# Patient Record
Sex: FEMALE | Race: WHITE | URBAN - METROPOLITAN AREA
[De-identification: names, ages, dates, MRNs, and addresses within clinical notes are randomized per-mention and may not be internally consistent; named-entity substitution may affect disease eponyms.]

---

## 2019-01-21 ENCOUNTER — THERAPY VISIT (OUTPATIENT)
Dept: PHYSICAL THERAPY | Facility: CLINIC | Age: 39
End: 2019-01-21
Payer: COMMERCIAL

## 2019-01-21 DIAGNOSIS — M54.2 NECK PAIN: ICD-10-CM

## 2019-01-21 DIAGNOSIS — G44.209 TENSION HEADACHE: ICD-10-CM

## 2019-01-21 PROCEDURE — 97112 NEUROMUSCULAR REEDUCATION: CPT | Mod: GP | Performed by: PHYSICAL THERAPIST

## 2019-01-21 PROCEDURE — 97161 PT EVAL LOW COMPLEX 20 MIN: CPT | Mod: GP | Performed by: PHYSICAL THERAPIST

## 2019-01-21 PROCEDURE — 97110 THERAPEUTIC EXERCISES: CPT | Mod: GP | Performed by: PHYSICAL THERAPIST

## 2019-01-21 NOTE — LETTER
[facility] ATHLETIC [facility] PHYSICAL THERAPY  [address]  [place] [address]  [phone]    2019    Re: Ashwini Garcia   :   1980  MRN:  [id number]   REFERRING PHYSICIAN:   Samuel Fields    [facility]    Date of Initial Evaluation:  2019  Visits:  Rxs Used: 1  Reason for Referral:     Neck pain  Tension headache    EVALUATION SUMMARY    Subjective:  Ashwini Garcia is a 38 year old female with a cervical spine condition. This is a new condition  19 onset increased tension and headache for unknown reasons other than stress.  Then provoked a migraine and then a headache. Bad asthma so coughs a lot and hunches forward, has 7 kids,  meth addict and just changed jobs so lots of stress.    Hypertension crisis.    Patient reports pain:  Cervical left side and cervical right side.  Radiates to: headache from back of neck/base of skull to top of head.  Pain is described as other (tightness, soreness with pressure) and is constant (headaches intermittent multiple times per day) and reported as 7/10 (7/10 at worst HA, neck 4/10 constant).  Associated symptoms:  Headache (SO region headaches, needs to re-focus/blink eyes sometimes). Pain is the same all the time.  Symptoms are exacerbated by looking up or down (looking down at phone, looking at artificial lights, had to call in sick to work due to HA) and relieved by nothing (medication, ice, heat all don't help).  Since onset symptoms are unchanged (initially got worse, no stabilized). General health as reported by patient is poor.  Pertinent medical history includes:  Asthma, depression, high blood pressure, osteoarthritis, mental illness and overweight (R hand dominant, R arm/hand/wrist numbness for a couple years, PCOS (polycistic ovary syndrome), fractured tailbone).  Medical allergies: no.  Surgical history: legs broken bones.   Current medications:  Anti-depressants and high blood pressure medication (lisinopril, depression ,prilosec, birth control, claritin, water pill for hormones).  Current occupation is .  Patient is working in normal job without restrictions.  Employment tasks: computer work 8 hours.  Barriers include:  Other (has 7 children,  meth addict).  Red flags:  Pain at rest/night and severe headaches.                 Objective:  Standing Alignment:    Cervical/Thoracic:  Dowager's hump, thoracic kyphosis increased and forward head (moderate mechanical changes upper trunk and spine with excessive anterior migration at C6-7)  Re: Ashwini Garcia   :   1980    Shoulder/UE:  Rounded shoulders       Cervical/Thoracic Evaluation  Arom wnl cervical: cervical protrusion no loss, cervical retraction min loss with strain from SO region to top of head/forehead.     AROM:  AROM Cervical:  Flexion:            No loss   Extension:       Mod loss  Rotation:         Left: min loss     Right: min loss  Side Bend:      Left: no loss     Right:  No loss  Strength: postural muscle weakenss  Headaches: migraine and cervical  Cervical Myotomes:  normal (shoulder IR 5/5 B, shoulder ER L 4+/5, R 5/5)  Cervical Dermatomes:  normal  Cervical Palpation:  : hypertonic and tender SO muscles, UT, levator, SCM.  Tenderness present at Left:    Sternocleidomstoid; Upper Trap; Levator; Erector Spinae and Suboccipitals  Tenderness not present at Left:   Scalenes  Tenderness present at Right:    Sternocleidomstoid; Upper Trap; Levator; Erector Spinae and Suboccipitals  Tenderness not present at Right:      Scalenes    Christian Cervical Evaluation  Posture:  Sitting: poor  Standing: poor  Protruding Head: yes  Wry Neck: no  Correction of Posture: better  Test Movements:  Pretest Pain Sittin/10 neck pain  PRO: During: no effect  After: no effect    RET: During: increases  After: no worse    Repeat RET: During: increases   After: no worse  Mechanical Response: IncROM  Conclusion: other (potential derangement, muscle tension limits repeated motion)  Principle of Treatment:  Posture Correction: Educate keep neutral C-T-L spine, use of lumbar support, use of cell phone at eye level not below, document reader at work, read with book neutral etc, avoid prolonged flexion, educate postural changes over time, dowager's hump, prevent poor progression of posture, affect on HA's/muscle tension etc  Educate frequent change of position throughout work day, etc  Extension: sustained supine retraction 2-3 min throughout the day  Other: self MFR SOR with tennis balls, eventually progression to scapular stabilization      Assessment/Plan:    Patient is a 38 year old female with cervical complaints.    Patient has the following significant findings with corresponding treatment plan.                Diagnosis 1:  Neck pain, HA's  Pain -  hot/cold therapy, manual therapy, self management, education and home program  Re: Ashwini Garcia   :   1980    Decreased ROM/flexibility - manual therapy, therapeutic exercise and home program  Decreased joint mobility - manual therapy, therapeutic exercise and home program  Decreased strength - therapeutic exercise, therapeutic activities and home program  Decreased function - therapeutic activities and home program  Impaired posture - neuro re-education and home program    Therapy Evaluation Codes:   1) History comprised of:   Personal factors that impact the plan of care:      Past/current experiences.    Comorbidity factors that impact the plan of care are:      Migraines/headaches, Numbness/tingling, Pain at night/rest and Weakness.     Medications impacting care: None.  2) Examination of Body Systems comprised of:   Body structures and functions that impact the plan of care:      Cervical spine.   Activity limitations that impact the plan of care are:      Driving and Working.  3) Clinical presentation  characteristics are:   Evolving/Changing.  4) Decision-Making    Low complexity using standardized patient assessment instrument and/or measureable assessment of functional outcome.  Cumulative Therapy Evaluation is: Low complexity.    Previous and current functional limitations:  (See Goal Flow Sheet for this information)    Short term and Long term goals: (See Goal Flow Sheet for this information)   Communication ability:  Patient appears to be able to clearly communicate and understand verbal and written communication and follow directions correctly.  Treatment Explanation - The following has been discussed with the patient:   RX ordered/plan of care  Anticipated outcomes  Possible risks and side effects  This patient would benefit from PT intervention to resume normal activities.   Rehab potential is good.    Frequency:  2 X week, once daily  Duration:  for 2 weeks tapering to 1x/week x 6 weeks  Discharge Plan:  Achieve all LTG.  Independent in home treatment program.  Reach maximal therapeutic benefit.    Thank you for your referral.    INQUIRIES  Therapist: Irasema Hilton DPT  [facility] [place] PH[facility]ICAL THERAPY  [address] [address]  Phone: [phone]  Fax: [phone]

## 2019-01-21 NOTE — PROGRESS NOTES
[facility] Initial Evaluation  Subjective:    Ashwini Garcia is a 38 year old female with a cervical spine condition.      This is a new condition  1/1/19 onset increased tension and headache for unknown reasons other than stress.  Then provoked a migraine and then a headache. Bad asthma so coughs a lot and hunches forward, has 7 kids,  meth attic and just changed jobs so lots of stress.      Hypertension crisis.    Patient reports pain:  Cervical left side and cervical right side.  Radiates to: headache from back of neck/base of skull to top of head.  Pain is described as other (tightness, soreness with pressure) and is constant (headaches intermittent multiple times per day) and reported as 7/10 (7/10 at worst HA, neck 4/10 constant).  Associated symptoms:  Headache (SO region headaches, needs to re-focus/blink eyes sometimes). Pain is the same all the time.  Symptoms are exacerbated by looking up or down (looking down at phone, looking at artificial lights, had to call in sick to work due to HA) and relieved by nothing (medication, ice, heat all don't help).  Since onset symptoms are unchanged (initially got worse, no stabilized).        General health as reported by patient is poor.  Pertinent medical history includes:  Asthma, depression, high blood pressure, osteoarthritis, mental illness and overweight (R hand dominant, R arm/hand/wrist numbness for a couple years, PCOS (polycistic ovary syndrome), fractured tailbone).  Medical allergies: no.  Surgical history: legs broken bones.  Current medications:  Anti-depressants and high blood pressure medication (lisinopril, depression ,prilosec, birth control, claritin, water pill for hormones).  Current occupation is     .  Patient is working in normal job without restrictions.  Employment tasks: computer work 8 hours.    Barriers include:  Other (has 7 children,  meth attic).    Red flags:  Pain at  rest/night and severe headaches.                        Objective:  Standing Alignment:    Cervical/Thoracic:  Dowager's hump, thoracic kyphosis increased and forward head (moderate mechanical changes upper trunk and spine with excessive anterior migration at C6-7)  Shoulder/UE:  Rounded shoulders                                  Cervical/Thoracic Evaluation  Arom wnl cervical: cervical protrusion no loss, cervical retraction min loss with strain from SO region to top of head/forehead.     AROM:  AROM Cervical:    Flexion:            No loss   Extension:       Mod loss  Rotation:         Left: min loss     Right: min loss  Side Bend:      Left: no loss     Right:  No loss    Strength: postural muscle weakenss  Headaches: migraine and cervical  Cervical Myotomes:  normal (shoulder IR 5/5 B, shoulder ER L 4+/5, R 5/5)                      Cervical Dermatomes:  normal                    Cervical Palpation:  : hypertonic and tender SO muscles, UT, levator, SCM.  Tenderness present at Left:    Sternocleidomstoid; Upper Trap; Levator; Erector Spinae and Suboccipitals  Tenderness not present at Left:   Scalenes  Tenderness present at Right:    Sternocleidomstoid; Upper Trap; Levator; Erector Spinae and Suboccipitals  Tenderness not present at Right:      Scalenes                                                  Christian Cervical Evaluation    Posture:  Sitting: poor  Standing: poor  Protruding Head: yes  Wry Neck: no  Correction of Posture: better      Test Movements:  Pretest Pain Sittin/10 neck pain  PRO: During: no effect  After: no effect      RET: During: increases  After: no worse    Repeat RET: During: increases  After: no worse  Mechanical Response: IncROM                          Conclusion: other (potential derangement, muscle tension limits repeated motion)  Principle of Treatment:  Posture Correction: Educate keep neutral C-T-L spine, use of lumbar support, use of cell phone at eye level not below, document  reader at work, read with book neutral etc, avoid prolonged flexion, educate postural changes over time, dowager's hump, prevent poor progression of posture, affect on HA's/muscle tension etc  Educate frequent change of position throughout work day, etc    Extension: sustained supine retraction 2-3 min throughout the day    Other: self MFR SOR with tennis balls, eventually progression to scapular stabilization                                         ROS    Assessment/Plan:    Patient is a 38 year old female with cervical complaints.    Patient has the following significant findings with corresponding treatment plan.                Diagnosis 1:  Neck pain, HA's    Pain -  hot/cold therapy, manual therapy, self management, education and home program  Decreased ROM/flexibility - manual therapy, therapeutic exercise and home program  Decreased joint mobility - manual therapy, therapeutic exercise and home program  Decreased strength - therapeutic exercise, therapeutic activities and home program  Decreased function - therapeutic activities and home program  Impaired posture - neuro re-education and home program    Therapy Evaluation Codes:   1) History comprised of:   Personal factors that impact the plan of care:      Past/current experiences.    Comorbidity factors that impact the plan of care are:      Migraines/headaches, Numbness/tingling, Pain at night/rest and Weakness.     Medications impacting care: None.  2) Examination of Body Systems comprised of:   Body structures and functions that impact the plan of care:      Cervical spine.   Activity limitations that impact the plan of care are:      Driving and Working.  3) Clinical presentation characteristics are:   Evolving/Changing.  4) Decision-Making    Low complexity using standardized patient assessment instrument and/or measureable assessment of functional outcome.  Cumulative Therapy Evaluation is: Low complexity.    Previous and current functional  limitations:  (See Goal Flow Sheet for this information)    Short term and Long term goals: (See Goal Flow Sheet for this information)     Communication ability:  Patient appears to be able to clearly communicate and understand verbal and written communication and follow directions correctly.  Treatment Explanation - The following has been discussed with the patient:   RX ordered/plan of care  Anticipated outcomes  Possible risks and side effects  This patient would benefit from PT intervention to resume normal activities.   Rehab potential is good.    Frequency:  2 X week, once daily  Duration:  for 2 weeks tapering to 1x/week x 6 weeks  Discharge Plan:  Achieve all LTG.  Independent in home treatment program.  Reach maximal therapeutic benefit.    Please refer to the daily flowsheet for treatment today, total treatment time and time spent performing 1:1 timed codes.

## 2019-02-04 ENCOUNTER — THERAPY VISIT (OUTPATIENT)
Dept: PHYSICAL THERAPY | Facility: CLINIC | Age: 39
End: 2019-02-04
Payer: COMMERCIAL

## 2019-02-04 DIAGNOSIS — M54.2 NECK PAIN: ICD-10-CM

## 2019-02-04 DIAGNOSIS — G44.209 TENSION HEADACHE: ICD-10-CM

## 2019-02-04 PROCEDURE — 97110 THERAPEUTIC EXERCISES: CPT | Mod: GP | Performed by: PHYSICAL THERAPIST

## 2019-02-04 PROCEDURE — 97140 MANUAL THERAPY 1/> REGIONS: CPT | Mod: GP | Performed by: PHYSICAL THERAPIST

## 2019-02-04 PROCEDURE — 97112 NEUROMUSCULAR REEDUCATION: CPT | Mod: GP | Performed by: PHYSICAL THERAPIST

## 2019-04-25 PROBLEM — M54.2 NECK PAIN: Status: RESOLVED | Noted: 2019-01-21 | Resolved: 2019-04-25

## 2019-04-25 PROBLEM — G44.209 TENSION HEADACHE: Status: RESOLVED | Noted: 2019-01-21 | Resolved: 2019-04-25

## 2019-04-25 NOTE — PROGRESS NOTES
Discharge Note    Progress reporting period is from initial evaluation date (please see noted date below) to Feb 4, 2019.  Linked Episodes   Type: Episode: Status: Noted: Resolved: Last update: Updated by:   PHYSICAL THERAPY neck pain 1/21/19 Active 1/21/2019 2/4/2019  4:51 PM Irasema Hilton, PT      Comments:       Ashwini failed to follow up and current status is unknown.  Please see information below for last relevant information on current status.  Patient seen for 2 visits.    SUBJECTIVE  Subjective changes noted by patient:  2 week lapse in therapy and patient reports today is the first day has had a HA since starting therapy 2 weeks.  HEP and posture exercises are really helping, along with an external support that is wearing most of the day.  Today has HA B frontal/top of head. Very pleased with improvements.  Has done SOR with tennis balls 2x/day when go to bed and when get up.  .  Current pain level is 0/10.     Previous pain level was  7/10(7/10 HA, 4/10 constant neck).   Changes in function:  Yes (See Goal flowsheet attached for changes in current functional level)  Adverse reaction to treatment or activity: None    OBJECTIVE  Changes noted in objective findings: CROM all WNL except min loss ext, retraction min loss.  Standing back against wall good technique with cervical retraction.  Prone scap set able to hold 10 seconds after cueing for relaxing UT tension.  Mild tenderness SO, R UT muscles although much improved.  Improved patient awareness of posture and able to self correct sometimes if tensing UT.     ASSESSMENT/PLAN  Diagnosis: neck pain   Updated problem list and treatment plan:   Pain - HEP  Decreased ROM/flexibility - HEP  Decreased function - HEP  Decreased strength - HEP  Impaired posture - HEP  STG/LTGs have been met or progress has been made towards goals:  Yes, please see goal flowsheet for most current information  Assessment of Progress: current status is unknown.    Last current status:  Pt is progressing well   Self Management Plans:  HEP  I have re-evaluated this patient and find that the nature, scope, duration and intensity of the therapy is appropriate for the medical condition of the patient.  Ashwini continues to require the following intervention to meet STG and LTG's:  HEP.    Recommendations:  Discharge with current home program.  Patient to follow up with MD as needed.    Please refer to the daily flowsheet for treatment today, total treatment time and time spent performing 1:1 timed codes.